# Patient Record
Sex: MALE | Race: WHITE | NOT HISPANIC OR LATINO | Employment: FULL TIME | URBAN - METROPOLITAN AREA
[De-identification: names, ages, dates, MRNs, and addresses within clinical notes are randomized per-mention and may not be internally consistent; named-entity substitution may affect disease eponyms.]

---

## 2019-11-04 ENCOUNTER — TELEPHONE (OUTPATIENT)
Dept: SCHEDULING | Facility: IMAGING CENTER | Age: 64
End: 2019-11-04

## 2020-02-21 ENCOUNTER — OFFICE VISIT (OUTPATIENT)
Dept: MEDICAL GROUP | Facility: MEDICAL CENTER | Age: 65
End: 2020-02-21
Payer: COMMERCIAL

## 2020-02-21 VITALS
HEIGHT: 72 IN | SYSTOLIC BLOOD PRESSURE: 118 MMHG | DIASTOLIC BLOOD PRESSURE: 72 MMHG | OXYGEN SATURATION: 97 % | WEIGHT: 184 LBS | HEART RATE: 68 BPM | BODY MASS INDEX: 24.92 KG/M2 | RESPIRATION RATE: 16 BRPM | TEMPERATURE: 97 F

## 2020-02-21 DIAGNOSIS — Z76.89 ENCOUNTER TO ESTABLISH CARE: ICD-10-CM

## 2020-02-21 DIAGNOSIS — E78.5 HYPERLIPIDEMIA, UNSPECIFIED HYPERLIPIDEMIA TYPE: ICD-10-CM

## 2020-02-21 DIAGNOSIS — Z12.12 SCREENING FOR COLORECTAL CANCER: ICD-10-CM

## 2020-02-21 DIAGNOSIS — E11.9 TYPE 2 DIABETES MELLITUS WITHOUT COMPLICATION, WITHOUT LONG-TERM CURRENT USE OF INSULIN (HCC): ICD-10-CM

## 2020-02-21 DIAGNOSIS — Z12.11 SCREENING FOR COLORECTAL CANCER: ICD-10-CM

## 2020-02-21 PROCEDURE — 99204 OFFICE O/P NEW MOD 45 MIN: CPT | Performed by: PHYSICIAN ASSISTANT

## 2020-02-21 RX ORDER — SIMVASTATIN 10 MG
TABLET ORAL
COMMUNITY
Start: 2020-01-25 | End: 2020-04-06 | Stop reason: SDUPTHER

## 2020-02-21 RX ORDER — FENOFIBRATE 160 MG/1
TABLET ORAL
COMMUNITY
End: 2020-04-06 | Stop reason: SDUPTHER

## 2020-02-21 RX ORDER — METFORMIN HYDROCHLORIDE 500 MG/1
1000 TABLET, EXTENDED RELEASE ORAL 2 TIMES DAILY
COMMUNITY
Start: 2020-01-26 | End: 2020-03-18 | Stop reason: SDUPTHER

## 2020-02-21 ASSESSMENT — PATIENT HEALTH QUESTIONNAIRE - PHQ9: CLINICAL INTERPRETATION OF PHQ2 SCORE: 0

## 2020-02-21 NOTE — LETTER
[facility]  Tony Morris P.A.-C.  [address]  [place] [address]  Fax: [phone]   Authorization for Release/Disclosure of   Protected Health Information   Name: LEONID MCGEE : 1955 SSN: [id number]   Address: [address] [place] [address] Phone:    [phone] (home) [phone] (work)   I authorize the entity listed below to release/disclose the PHI below to:   [facility]/Tony Morris P.A.-C. and Tony Morris P.A.-C.   Provider or Entity Name:  [facility]   Address   City, State, Zip   Phone:      Fax:     Reason for request: continuity of care   Information to be released: Labs done in past 6 months.   [  ] LAST COLONOSCOPY,  including any PATH REPORT and follow-up  [  ] LAST FIT/COLOGUARD RESULT [  ] LAST DEXA  [  ] LAST MAMMOGRAM  [  ] LAST PAP  [ X ] LAST LABS [  ] RETINA EXAM REPORT  [  ] IMMUNIZATION RECORDS  [  ] Release all info      [  ] Check here and initial the line next to each item to release ALL health information INCLUDING  _____ Care and treatment for drug and / or alcohol abuse  _____ HIV testing, infection status, or AIDS  _____ Genetic Testing    DATES OF SERVICE OR TIME PERIOD TO BE DISCLOSED: _____________  I understand and acknowledge that:  * This Authorization may be revoked at any time by you in writing, except if your health information has already been used or disclosed.  * Your health information that will be used or disclosed as a result of you signing this authorization could be re-disclosed by the recipient. If this occurs, your re-disclosed health information may no longer be protected by State or Federal laws.  * You may refuse to sign this Authorization. Your refusal will not affect your ability to obtain treatment.  * This Authorization becomes effective upon signing and will  on (date) __________.      If no date is indicated, this Authorization will  one (1) year from the signature date.    Name:  Hank Mariee    Signature:   Date:     2/21/2020       PLEASE FAX REQUESTED RECORDS BACK TO: [phone]

## 2020-02-21 NOTE — PROGRESS NOTES
Subjective:   Hank Mariee is a 64 y.o. male here today for establishing care, type 2 diabetes and hyperlipidemia.    Type 2 diabetes mellitus without complication, without long-term current use of insulin (HCC)  This is a 64-year-old male here today with his wife both of them to establish care.  Wife's name is Shirley.  History of diabetes and hyperlipidemia.  Takes metformin extended release 2000 mg daily.  Last labs were done in November.  [facility]  Also takes simvastatin and fenofibrate.  Had a colonoscopy 10 years ago.  Had some vaccinations that need to be updated in our vaccination records.  No complaints today.       Current medicines (including changes today)  Current Outpatient Medications   Medication Sig Dispense Refill   • metFORMIN ER (GLUCOPHAGE XR) 500 MG TABLET SR 24 HR Take 1,000 mg by mouth 2 Times a Day.     • simvastatin (ZOCOR) 10 MG Tab      • fenofibrate (TRIGLIDE) 160 MG tablet        No current facility-administered medications for this visit.      He  has no past medical history on file.    Social History and Family History were reviewed and updated.    ROS   No chest pain, no shortness of breath, no abdominal pain and all other systems were reviewed and are negative.       Objective:     /72 (BP Location: Right arm, Patient Position: Sitting, BP Cuff Size: Adult)   Pulse 68   Temp 36.1 °C (97 °F) (Temporal)   Resp 16   Ht 1.829 m (6')   Wt 83.5 kg (184 lb)   SpO2 97%  Body mass index is 24.95 kg/m².   Physical Exam:  Constitutional: Alert, no distress.  Skin: Warm, dry, good turgor, no rashes in visible areas.  Eye: Equal, round and reactive, conjunctiva clear, lids normal.  ENMT: Lips without lesions, good dentition, oropharynx clear.  Neck: Trachea midline, no masses.   Lymph: No cervical or supraclavicular lymphadenopathy  Respiratory: Unlabored respiratory effort, lungs clear to auscultation, no wheezes, no  mary.  Cardiovascular: Normal S1, S2, no murmur, no edema.  Psych: Alert and oriented x3, normal affect and mood.        Assessment and Plan:   The following treatment plan was discussed    1. Type 2 diabetes mellitus without complication, without long-term current use of insulin (HCC)  Chronic condition.  Likely stable.  Will obtain medical records from [facility].  Advised that I will change his metformin to thousand milligrams twice a day and will likely change to IR instead of ER.    2. Hyperlipidemia, unspecified hyperlipidemia type  Chronic condition.  Status unknown.  Will obtain recent labs from [facility].    3. Encounter to establish care    4. Screening for colorectal cancer  Furred to GI for colonoscopy.  - REFERRAL TO GI FOR COLONOSCOPY      Followup: Return in about 3 months (around 5/21/2020), or if symptoms worsen or fail to improve, for In May.    Please note that this dictation was created using voice recognition software. I have made every reasonable attempt to correct obvious errors, but I expect that there are errors of grammar and possibly content that I did not discover before finalizing the note.

## 2020-02-21 NOTE — ASSESSMENT & PLAN NOTE
This is a 64-year-old male here today with his wife both of them to establish care.  Wife's name is Shirley.  History of diabetes and hyperlipidemia.  Takes metformin extended release 2000 mg daily.  Last labs were done in November.  Things were in normal range and done at [facility].  Also takes simvastatin and fenofibrate.  Had a colonoscopy 10 years ago.  Had some vaccinations that need to be updated in our vaccination records.  No complaints today.

## 2020-03-18 NOTE — TELEPHONE ENCOUNTER
received via [employer]    Received request via: Pharmacy    Was the patient seen in the last year in this department? Yes     Next Appt: 05/12/2020      Does the patient have an active prescription (recently filled or refills available) for medication(s) requested? No     Requested Prescriptions     Pending Prescriptions Disp Refills   • metFORMIN ER (GLUCOPHAGE XR) 500 MG TABLET SR 24 HR       Sig: Take 2 Tabs by mouth 2 Times a Day.

## 2020-03-19 RX ORDER — METFORMIN HYDROCHLORIDE 500 MG/1
1000 TABLET, EXTENDED RELEASE ORAL 2 TIMES DAILY
Qty: 180 TAB | Refills: 0 | Status: SHIPPED | OUTPATIENT
Start: 2020-03-19 | End: 2020-05-20 | Stop reason: SDUPTHER

## 2020-05-01 ENCOUNTER — TELEPHONE (OUTPATIENT)
Dept: MEDICAL GROUP | Facility: MEDICAL CENTER | Age: 65
End: 2020-05-01

## 2020-05-01 NOTE — TELEPHONE ENCOUNTER
----- Message from Tony Morris P.A.-C. sent at 5/1/2020  8:16 AM PDT -----  Regarding: FW: Non-Urgent Medical Question  Contact: [phone]  Would [facility] be able [facility] for both he and his wife Shirley, same last name, about getting labs for the past 3-6 months?  Or does he need to sign a medical release?    Tony  ----- Message -----  From: Gilmar Perez, Med Ass't  Sent: 4/30/2020   4:21 PM PDT  To: Tony Morris P.A.-C.  Subject: FW: Non-Urgent Medical Question                    ----- Message -----  From: Hank Mariee  Sent: 4/30/2020   3:25 PM PDT  To: [facility]  Clinical Staff  Subject: Non-Urgent Medical Question                      Hi - we requested the most recent lab results from our previous physician, Dr. Jo-Ann Parr.  We have been told that we need to provide a form from your office requesting the lab results - and then they will release them to your office.  Shirley and I set to see you on May 12th and thought you would want the most recent results from December - we have separately [facility] the [facility] and they have now submitted up to September 2019.

## 2020-05-12 ENCOUNTER — OFFICE VISIT (OUTPATIENT)
Dept: MEDICAL GROUP | Facility: MEDICAL CENTER | Age: 65
End: 2020-05-12
Payer: COMMERCIAL

## 2020-05-12 VITALS
HEART RATE: 84 BPM | RESPIRATION RATE: 16 BRPM | BODY MASS INDEX: 24.78 KG/M2 | OXYGEN SATURATION: 96 % | TEMPERATURE: 96.8 F | DIASTOLIC BLOOD PRESSURE: 58 MMHG | WEIGHT: 182.98 LBS | HEIGHT: 72 IN | SYSTOLIC BLOOD PRESSURE: 106 MMHG

## 2020-05-12 DIAGNOSIS — E78.5 HYPERLIPIDEMIA, UNSPECIFIED HYPERLIPIDEMIA TYPE: ICD-10-CM

## 2020-05-12 DIAGNOSIS — E11.9 TYPE 2 DIABETES MELLITUS WITHOUT COMPLICATION, WITHOUT LONG-TERM CURRENT USE OF INSULIN (HCC): ICD-10-CM

## 2020-05-12 DIAGNOSIS — Z11.59 ENCOUNTER FOR HEPATITIS C SCREENING TEST FOR LOW RISK PATIENT: ICD-10-CM

## 2020-05-12 PROCEDURE — 99214 OFFICE O/P EST MOD 30 MIN: CPT | Performed by: PHYSICIAN ASSISTANT

## 2020-05-12 NOTE — PROGRESS NOTES
Subjective:   Hank Mariee is a 64 y.o. male here today for diabetes, hyperlipidemia and preventative health care.    Type 2 diabetes mellitus without complication, without long-term current use of insulin (HCC)  This is a 64-year-old male here today with his wife to discuss his health.  Chronic history of diabetes.  Last A1c at 6.5.  No refill needed medications needed today.  Takes metformin 500 mg of 2 tablets of extended release twice a day.  Does need an updated A1c.  Also microalbumin level.     Hyperlipidemia  Chronic condition.  Takes Zocor 10 mg daily.  Last cholesterol profile in good range.  No family history of heart disease.       Current medicines (including changes today)  Current Outpatient Medications   Medication Sig Dispense Refill   • simvastatin (ZOCOR) 10 MG Tab Take 1 Tab by mouth every evening. 90 Tab 1   • fenofibrate (TRIGLIDE) 160 MG tablet Take 1 Tab by mouth every day. 90 Tab 1   • metFORMIN ER (GLUCOPHAGE XR) 500 MG TABLET SR 24 HR Take 2 Tabs by mouth 2 Times a Day. 180 Tab 0     No current facility-administered medications for this visit.      He  has no past medical history on file.    Social History and Family History were reviewed and updated.    ROS   No chest pain, no shortness of breath, no abdominal pain and all other systems were reviewed and are negative.       Objective:     /58   Pulse 84   Temp 36 °C (96.8 °F) (Temporal)   Resp 16   Ht 1.829 m (6')   Wt 83 kg (182 lb 15.7 oz)   SpO2 96%  Body mass index is 24.82 kg/m².   Physical Exam:  Constitutional: Alert, no distress.  Skin: Warm, dry, good turgor, no rashes in visible areas.  Eye: Equal, round and reactive, conjunctiva clear, lids normal.  ENMT: Lips without lesions, good dentition, oropharynx clear.  Neck: Trachea midline, no masses.   Lymph: No cervical or supraclavicular lymphadenopathy  Respiratory: Unlabored respiratory effort, lungs appear clear, no wheezes.  Cardiovascular: Regular rate  and rhythm.  Psych: Alert and oriented x3, normal affect and mood.        Assessment and Plan:   The following treatment plan was discussed    1. Type 2 diabetes mellitus without complication, without long-term current use of insulin (HCC)  Chronic condition.  Stable.  Will renew medication when it comes up for renewal.  Continue medications as directed.  Ordered labs.  - HEMOGLOBIN A1C; Future  - MICROALBUMIN CREAT RATIO URINE (LAB COLLECT); Future    2. Hyperlipidemia, unspecified hyperlipidemia type  Chronic condition.  Stable.  Continue statin.    3. Encounter for hepatitis C screening test for low risk patient  Hep C viral antibody ordered.  Low risk.  - HEP C VIRUS ANTIBODY; Future    Await colonoscopy scheduled for sometime in the next few months.  Also will need to order PSA during next labs.  They go to [facility].    Followup: Return in about 6 months (around 11/12/2020), or if symptoms worsen or fail to improve.    Please note that this dictation was created using voice recognition software. I have made every reasonable attempt to correct obvious errors, but I expect that there are errors of grammar and possibly content that I did not discover before finalizing the note.

## 2020-05-12 NOTE — ASSESSMENT & PLAN NOTE
Chronic condition.  Takes Zocor 10 mg daily.  Last cholesterol profile in good range.  No family history of heart disease.

## 2020-05-12 NOTE — ASSESSMENT & PLAN NOTE
This is a 64-year-old male here today with his wife to discuss his health.  Chronic history of diabetes.  Last A1c at 6.5.  No refill needed medications needed today.  Takes metformin 500 mg of 2 tablets of extended release twice a day.  Does need an updated A1c.  Also microalbumin level.

## 2020-05-19 ENCOUNTER — PATIENT MESSAGE (OUTPATIENT)
Dept: MEDICAL GROUP | Facility: MEDICAL CENTER | Age: 65
End: 2020-05-19

## 2020-05-20 RX ORDER — METFORMIN HYDROCHLORIDE 500 MG/1
1000 TABLET, EXTENDED RELEASE ORAL 2 TIMES DAILY
Qty: 180 TAB | Refills: 0 | Status: SHIPPED | OUTPATIENT
Start: 2020-05-20 | End: 2020-07-18 | Stop reason: SDUPTHER

## 2020-06-06 LAB
ALBUMIN/CREAT UR: 3 MG/G CREAT (ref 0–29)
CREAT UR-MCNC: 145.3 MG/DL
HBA1C MFR BLD: 6.9 % (ref 4.8–5.6)
HCV AB S/CO SERPL IA: 0.2 S/CO RATIO (ref 0–0.9)
MICROALBUMIN UR-MCNC: 4.7 UG/ML

## 2020-07-18 DIAGNOSIS — E78.5 HYPERLIPIDEMIA, UNSPECIFIED HYPERLIPIDEMIA TYPE: ICD-10-CM

## 2020-07-18 DIAGNOSIS — E11.9 TYPE 2 DIABETES MELLITUS WITHOUT COMPLICATION, WITHOUT LONG-TERM CURRENT USE OF INSULIN (HCC): ICD-10-CM

## 2020-07-18 RX ORDER — FENOFIBRATE 160 MG/1
160 TABLET ORAL DAILY
Qty: 90 TAB | Refills: 1 | Status: SHIPPED | OUTPATIENT
Start: 2020-07-18 | End: 2020-11-17 | Stop reason: SDUPTHER

## 2020-07-18 RX ORDER — METFORMIN HYDROCHLORIDE 500 MG/1
1000 TABLET, EXTENDED RELEASE ORAL 2 TIMES DAILY
Qty: 180 TAB | Refills: 1 | Status: SHIPPED | OUTPATIENT
Start: 2020-07-18 | End: 2020-10-18

## 2020-07-18 RX ORDER — SIMVASTATIN 10 MG
10 TABLET ORAL EVERY EVENING
Qty: 90 TAB | Refills: 1 | Status: SHIPPED | OUTPATIENT
Start: 2020-07-18 | End: 2020-11-17 | Stop reason: SDUPTHER

## 2020-11-16 RX ORDER — SIMVASTATIN 10 MG
TABLET ORAL
COMMUNITY
End: 2020-11-17

## 2020-11-17 ENCOUNTER — OFFICE VISIT (OUTPATIENT)
Dept: MEDICAL GROUP | Facility: MEDICAL CENTER | Age: 65
End: 2020-11-17
Payer: COMMERCIAL

## 2020-11-17 VITALS
BODY MASS INDEX: 24.49 KG/M2 | SYSTOLIC BLOOD PRESSURE: 116 MMHG | DIASTOLIC BLOOD PRESSURE: 64 MMHG | HEART RATE: 70 BPM | OXYGEN SATURATION: 98 % | RESPIRATION RATE: 16 BRPM | HEIGHT: 72 IN | TEMPERATURE: 97.4 F | WEIGHT: 180.78 LBS

## 2020-11-17 DIAGNOSIS — E78.5 HYPERLIPIDEMIA, UNSPECIFIED HYPERLIPIDEMIA TYPE: ICD-10-CM

## 2020-11-17 DIAGNOSIS — Z12.5 SCREENING PSA (PROSTATE SPECIFIC ANTIGEN): ICD-10-CM

## 2020-11-17 DIAGNOSIS — E11.9 TYPE 2 DIABETES MELLITUS WITHOUT COMPLICATION, WITHOUT LONG-TERM CURRENT USE OF INSULIN (HCC): ICD-10-CM

## 2020-11-17 PROBLEM — L84 CORN OF TOE: Status: ACTIVE | Noted: 2020-11-17

## 2020-11-17 PROCEDURE — 99214 OFFICE O/P EST MOD 30 MIN: CPT | Performed by: PHYSICIAN ASSISTANT

## 2020-11-17 RX ORDER — FENOFIBRATE 160 MG/1
160 TABLET ORAL DAILY
Qty: 90 TAB | Refills: 1 | Status: SHIPPED | OUTPATIENT
Start: 2020-11-17

## 2020-11-17 RX ORDER — SIMVASTATIN 10 MG
10 TABLET ORAL EVERY EVENING
Qty: 90 TAB | Refills: 1 | Status: SHIPPED | OUTPATIENT
Start: 2020-11-17

## 2020-11-17 RX ORDER — METFORMIN HYDROCHLORIDE 500 MG/1
1000 TABLET, EXTENDED RELEASE ORAL 2 TIMES DAILY
Qty: 180 TAB | Refills: 1 | Status: SHIPPED | OUTPATIENT
Start: 2020-11-17

## 2020-11-17 NOTE — ASSESSMENT & PLAN NOTE
Chronic condition.  Last cholesterol profile in good ranges.  Takes Zocor 10 mg daily.  Has been taking medication for many years.  No family history of heart disease but there is a family history of stroke.

## 2020-11-17 NOTE — ASSESSMENT & PLAN NOTE
This is a 65-year-old male here today with his wife, Shirley.  History of type 2 diabetes.  Last A1c well controlled.  Currently on Metformin 500 mg XR tablets 2 tablets daily.  No new complaints today.  Doing well.  Does state when he was actively hiking his heart rate will go up to 150.  He was asymptomatic at the time.  Typical resting heart rate is in the 50s.  Today his heart rate is at 70.

## 2020-11-17 NOTE — PROGRESS NOTES
Subjective:   Hank Mariee is a 65 y.o. male here today for type 2 diabetes and hyperlipidemia.    Type 2 diabetes mellitus without complication, without long-term current use of insulin (HCC)  This is a 65-year-old male here today with his wife, Shirley.  History of type 2 diabetes.  Last A1c well controlled.  Currently on Metformin 500 mg XR tablets 2 tablets daily.  No new complaints today.  Doing well.  Does state when he was actively hiking his heart rate will go up to 150.  He was asymptomatic at the time.  Typical resting heart rate is in the 50s.  Today his heart rate is at 70.    Hyperlipidemia  Chronic condition.  Last cholesterol profile in good ranges.  Takes Zocor 10 mg daily.  Has been taking medication for many years.  No family history of heart disease but there is a family history of stroke.       Current medicines (including changes today)  Current Outpatient Medications   Medication Sig Dispense Refill   • simvastatin (ZOCOR) 10 MG Tab Take 1 Tab by mouth every evening. 90 Tab 1   • fenofibrate (TRIGLIDE) 160 MG tablet Take 1 Tab by mouth every day. 90 Tab 1   • metFORMIN ER (GLUCOPHAGE XR) 500 MG TABLET SR 24 HR Take 2 Tabs by mouth 2 times a day. 180 Tab 1     No current facility-administered medications for this visit.      He  has no past medical history on file.    Social History and Family History were reviewed and updated.    ROS   No chest pain, no shortness of breath, no abdominal pain and all other systems were reviewed and are negative.       Objective:     /64   Pulse 70   Temp 36.3 °C (97.4 °F) (Temporal)   Resp 16   Ht 1.829 m (6')   Wt 82 kg (180 lb 12.4 oz)   SpO2 98%  Body mass index is 24.52 kg/m².   Physical Exam:  Constitutional: Alert, no distress.  Skin: Warm, dry, good turgor, no rashes in visible areas.  Eye: Equal, round and reactive, conjunctiva clear, lids normal.  ENMT: Lips without lesions, good dentition, oropharynx clear.  Neck: Trachea midline,  no masses.   Lymph: No cervical or supraclavicular lymphadenopathy  Respiratory: Unlabored respiratory effort, lungs clear to auscultation, no wheezes, no ronchi.  Cardiovascular: Normal S1, S2, no murmur, no edema.  Psych: Alert and oriented x3, normal affect and mood.        Assessment and Plan:   The following treatment plan was discussed    1. Type 2 diabetes mellitus without complication, without long-term current use of insulin (HCC)  Chronic condition.  Likely stable.  Order labs for June.  Fast 8 hours.  Renewed Metformin.  - Comp Metabolic Panel; Future  - HEMOGLOBIN A1C; Future  - Lipid Profile; Future  - metFORMIN ER (GLUCOPHAGE XR) 500 MG TABLET SR 24 HR; Take 2 Tabs by mouth 2 times a day.  Dispense: 180 Tab; Refill: 1    2. Hyperlipidemia, unspecified hyperlipidemia type  Chronic condition.  Stable.  Renewed Zocor and fenofibrate.  No known history of CAD.  Ordered CT cardiac scoring and discussed risk and benefits.  - CT-CARDIAC SCORING; Future  - simvastatin (ZOCOR) 10 MG Tab; Take 1 Tab by mouth every evening.  Dispense: 90 Tab; Refill: 1  - fenofibrate (TRIGLIDE) 160 MG tablet; Take 1 Tab by mouth every day.  Dispense: 90 Tab; Refill: 1    3. Screening PSA (prostate specific antigen)  PSA ordered.  Screening.  Asymptomatic.  - PROSTATE SPECIFIC AG SCREENING; Future      Followup: Return in about 6 months (around 5/17/2021), or if symptoms worsen or fail to improve.    Please note that this dictation was created using voice recognition software. I have made every reasonable attempt to correct obvious errors, but I expect that there are errors of grammar and possibly content that I did not discover before finalizing the note.

## 2020-12-14 ENCOUNTER — HOSPITAL ENCOUNTER (OUTPATIENT)
Dept: LAB | Facility: MEDICAL CENTER | Age: 65
End: 2020-12-14
Attending: PHYSICIAN ASSISTANT
Payer: COMMERCIAL

## 2020-12-14 DIAGNOSIS — Z20.822 SUSPECTED COVID-19 VIRUS INFECTION: ICD-10-CM

## 2020-12-14 LAB — COVID ORDER STATUS COVID19: NORMAL

## 2020-12-14 PROCEDURE — U0003 INFECTIOUS AGENT DETECTION BY NUCLEIC ACID (DNA OR RNA); SEVERE ACUTE RESPIRATORY SYNDROME CORONAVIRUS 2 (SARS-COV-2) (CORONAVIRUS DISEASE [COVID-19]), AMPLIFIED PROBE TECHNIQUE, MAKING USE OF HIGH THROUGHPUT TECHNOLOGIES AS DESCRIBED BY CMS-2020-01-R: HCPCS

## 2020-12-14 PROCEDURE — C9803 HOPD COVID-19 SPEC COLLECT: HCPCS

## 2020-12-15 PROBLEM — U07.1 COVID-19 VIRUS INFECTION: Status: ACTIVE | Noted: 2020-12-15

## 2020-12-15 LAB
SARS-COV-2 RNA RESP QL NAA+PROBE: DETECTED
SPECIMEN SOURCE: ABNORMAL

## 2021-03-03 DIAGNOSIS — Z23 NEED FOR VACCINATION: ICD-10-CM

## 2021-07-01 ENCOUNTER — OFFICE VISIT (OUTPATIENT)
Dept: URGENT CARE | Facility: PHYSICIAN GROUP | Age: 66
End: 2021-07-01
Payer: COMMERCIAL

## 2021-07-01 VITALS
HEART RATE: 67 BPM | TEMPERATURE: 98.4 F | WEIGHT: 177 LBS | BODY MASS INDEX: 23.98 KG/M2 | SYSTOLIC BLOOD PRESSURE: 130 MMHG | DIASTOLIC BLOOD PRESSURE: 72 MMHG | OXYGEN SATURATION: 98 % | RESPIRATION RATE: 16 BRPM | HEIGHT: 72 IN

## 2021-07-01 DIAGNOSIS — H01.00B BLEPHARITIS OF BOTH UPPER AND LOWER EYELID OF LEFT EYE, UNSPECIFIED TYPE: ICD-10-CM

## 2021-07-01 PROBLEM — E11.8 DISORDER ASSOCIATED WITH TYPE 2 DIABETES MELLITUS (HCC): Status: ACTIVE | Noted: 2021-02-01

## 2021-07-01 PROCEDURE — 99213 OFFICE O/P EST LOW 20 MIN: CPT | Performed by: NURSE PRACTITIONER

## 2021-07-01 RX ORDER — POLYMYXIN B SULFATE AND TRIMETHOPRIM 1; 10000 MG/ML; [USP'U]/ML
1 SOLUTION OPHTHALMIC 4 TIMES DAILY
Qty: 10 ML | Refills: 0 | Status: SHIPPED | OUTPATIENT
Start: 2021-07-01 | End: 2021-07-11

## 2021-07-01 ASSESSMENT — ENCOUNTER SYMPTOMS
DOUBLE VISION: 0
COUGH: 0
BLURRED VISION: 0
EYE DISCHARGE: 1
FEVER: 0
VOMITING: 0
PHOTOPHOBIA: 0
FOREIGN BODY SENSATION: 0
EYE PAIN: 0
EYE ITCHING: 1

## 2021-07-02 NOTE — PROGRESS NOTES
Subjective:     Hank Mariee is a 65 y.o. male who presents for Conjunctivitis (x4 days, Pt states pos conjunctivitis, itchy, watery eyes, swollen, uncomfortable, pt states at night is worse  )      Left eye, redness and irritation to upper and lower lids x 4 days. No hx od allergies. Started as a watery, itchy eye. Benadryl helped some. Now right has a similar sensation. Scratchiness intermittent. No vision changes. No redness of eyeball. Mucus at night. Sticky eye lashes. No use of contacts. Wears glasses. Feels fine otherwise.    Conjunctivitis  This is a new problem. The current episode started in the past 7 days. Pertinent negatives include no coughing, fever or vomiting.   Eye Problem   The left eye is affected. This is a new problem. The current episode started in the past 7 days. The problem has been gradually worsening. The pain is at a severity of 0/10. The patient is experiencing no pain. There is no known exposure to pink eye. He does not wear contacts. Associated symptoms include an eye discharge and itching. Pertinent negatives include no blurred vision, double vision, fever, foreign body sensation, photophobia, recent URI or vomiting.       History reviewed. No pertinent past medical history.    History reviewed. No pertinent surgical history.    Social History     Socioeconomic History   • Marital status:      Spouse name: Not on file   • Number of children: Not on file   • Years of education: Not on file   • Highest education level: Not on file   Occupational History   • Not on file   Tobacco Use   • Smoking status: Never Smoker   • Smokeless tobacco: Never Used   Substance and Sexual Activity   • Alcohol use: Not Currently   • Drug use: Never   • Sexual activity: Not on file   Other Topics Concern   • Not on file   Social History Narrative   • Not on file     Social Determinants of Health     Financial Resource Strain:    • Difficulty of Paying Living Expenses:    Food  Insecurity:    • Worried About Running Out of Food in the Last Year:    • Ran Out of Food in the Last Year:    Transportation Needs:    • Lack of Transportation (Medical):    • Lack of Transportation (Non-Medical):    Physical Activity:    • Days of Exercise per Week:    • Minutes of Exercise per Session:    Stress:    • Feeling of Stress :    Social Connections:    • Frequency of Communication with Friends and Family:    • Frequency of Social Gatherings with Friends and Family:    • Attends [employer]ah[employer] Services:    • Active Member of Clubs or Organizations:    • Attends Club or Organization Meetings:    • Marital Status:    Intimate Partner Violence:    • Fear of Current or Ex-Partner:    • Emotionally Abused:    • Physically Abused:    • Sexually Abused:         History reviewed. No pertinent family history.     No Known Allergies    Review of Systems   Constitutional: Negative for fever.   Eyes: Positive for discharge and itching. Negative for blurred vision, double vision, photophobia and pain.   Respiratory: Negative for cough.    Gastrointestinal: Negative for vomiting.   Endo/Heme/Allergies: Negative for environmental allergies.   All other systems reviewed and are negative.       Objective:   /72   Pulse 67   Temp 36.9 °C (98.4 °F) (Temporal)   Resp 16   Ht 1.829 m (6')   Wt 80.3 kg (177 lb)   SpO2 98%   BMI 24.01 kg/m²     Physical Exam  Vitals reviewed.   Constitutional:       General: He is not in acute distress.     Appearance: He is well-developed.   HENT:      Head: Normocephalic and atraumatic.      Right Ear: External ear normal.      Left Ear: External ear normal.      Nose: Nose normal.   Eyes:      General:         Left eye: Discharge present.No hordeolum.      Conjunctiva/sclera: Conjunctivae normal.      Left eye: Left conjunctiva is not injected. No chemosis, exudate or hemorrhage.     Pupils: Pupils are equal, round, and reactive to light.      Comments: Left eye: Watery discharge.  Erythema of bilateral lids.    Cardiovascular:      Rate and Rhythm: Normal rate.   Pulmonary:      Effort: Pulmonary effort is normal.   Musculoskeletal:         General: Normal range of motion.      Cervical back: Normal range of motion.   Skin:     General: Skin is warm and dry.      Findings: No rash.   Neurological:      General: No focal deficit present.      Mental Status: He is alert and oriented to person, place, and time.      GCS: GCS eye subscore is 4. GCS verbal subscore is 5. GCS motor subscore is 6.   Psychiatric:         Mood and Affect: Mood normal.         Speech: Speech normal.         Behavior: Behavior normal.         Thought Content: Thought content normal.         Judgment: Judgment normal.         Assessment/Plan:   1. Blepharitis of both upper and lower eyelid of left eye, unspecified type  - polymixin-trimethoprim (POLYTRIM) 51198-1.1 UNIT/ML-% Solution; Administer 1 Drop into the left eye 4 times a day for 10 days.  Dispense: 10 mL; Refill: 0  -Eyelid and hand hygiene.  -Warm compress.    -Cleanse and massage eyelid. Use warm water or diluted baby shampoo on a clean wash cloth, gauze pad, or cotton swab; and gently massage the edge of the eyelid with a gentle circular motions. Over the counter eyelid cleaning solutions are also available.  -Artificial tears to moisturize eyes.    Follow up for persistent or worsening symptoms, increased redness or swelling, vision changes, decreased eye movement, new or increased pain, or fever.    Differential diagnosis, natural history, supportive care, and indications for immediate follow-up discussed.

## 2021-07-02 NOTE — PATIENT INSTRUCTIONS
-Eyelid and hand hygiene.  -Warm compress.    -Cleanse and massage eyelid. Use warm water or diluted baby shampoo on a clean wash cloth, gauze pad, or cotton swab; and gently massage the edge of the eyelid with a gentle circular motions. Over the counter eyelid cleaning solutions are also available.  -Artificial tears to moisturize eyes.    Follow up for persistent or worsening symptoms, increased redness or swelling, vision changes, decreased eye movement, new or increased pain, or fever.      Blepharitis  Blepharitis is inflammation of the eyelids. Blepharitis may happen with:  · Reddish, scaly skin around the scalp and eyebrows.  · Burning or itching of the eyelids.  · Eye discharge at night that causes the eyelashes to stick together in the morning.  · Eyelashes that fall out.  · Sensitivity to light.  Follow these instructions at home:  Pay attention to any changes in how your eyes look or feel. Tell your health care provider about any changes. Follow these instructions to help with your condition.  Keeping Clean    · Wash your hands often.  · Wash your eyelids with warm water or with warm water that is mixed with a small amount of baby shampoo. Do this two times per day or as often as needed.  · Wash your face and eyebrows at least once a day.  · Use a clean towel each time you dry your eyelids. Do not use this towel to clean or dry other areas of your body. Do not share your towel with anyone.  General instructions  · Avoid wearing makeup until you get better. Do not share makeup with anyone.  · Avoid rubbing your eyes.  · Apply warm compresses to your eyes 2 times per day for 10 minutes at a time, or as told by your health care provider.  · If you were prescribed an antibiotic ointment or steroid drops, apply or use the medicine as told by your health care provider. Do not stop using the medicine even if you feel better.  · Keep all follow-up visits as told by your health care provider. This is  important.  Contact a health care provider if:  · Your eyelids feel hot.  · You have blisters or a rash on your eyelids.  · The condition does not go away in 2-4 days.  · The inflammation gets worse.  Get help right away if:  · You have pain or redness that gets worse or spreads to other parts of your face.  · Your vision changes.  · You have pain when looking at lights or moving objects.  · You have a fever.  Summary  · Blepharitis is inflammation of the eyelids.  · Pay attention to any changes in how your eyes look or feel. Tell your health care provider about any changes.  · Follow home care instructions as told by your health care provider. Wash your hands often. Avoid wearing makeup. Do not rub your eyes.  · To treat this condition, use warm compresses and prescription ointments or eye drops.  · Let your health care provider know if you have vision changes, blisters or rash on eyelids, pain that spreads to your face, or warmth on your eyelids.  This information is not intended to replace advice given to you by your health care provider. Make sure you discuss any questions you have with your health care provider.  Document Released: 12/15/2001 Document Revised: 06/10/2019 Document Reviewed: 06/10/2019  Elsevier Patient Education © 2020 [employer]

## 2022-05-02 ENCOUNTER — OFFICE VISIT (OUTPATIENT)
Dept: URGENT CARE | Facility: PHYSICIAN GROUP | Age: 67
End: 2022-05-02
Payer: COMMERCIAL

## 2022-05-02 VITALS
WEIGHT: 184.2 LBS | HEART RATE: 62 BPM | TEMPERATURE: 97.8 F | SYSTOLIC BLOOD PRESSURE: 118 MMHG | HEIGHT: 71 IN | BODY MASS INDEX: 25.79 KG/M2 | RESPIRATION RATE: 14 BRPM | DIASTOLIC BLOOD PRESSURE: 56 MMHG | OXYGEN SATURATION: 99 %

## 2022-05-02 DIAGNOSIS — H10.503 BLEPHAROCONJUNCTIVITIS OF BOTH EYES, UNSPECIFIED BLEPHAROCONJUNCTIVITIS TYPE: ICD-10-CM

## 2022-05-02 PROCEDURE — 99213 OFFICE O/P EST LOW 20 MIN: CPT | Performed by: EMERGENCY MEDICINE

## 2022-05-02 RX ORDER — ERYTHROMYCIN 5 MG/G
1 OINTMENT OPHTHALMIC 3 TIMES DAILY
Qty: 1 G | Refills: 0 | Status: SHIPPED | OUTPATIENT
Start: 2022-05-02

## 2022-05-02 ASSESSMENT — ENCOUNTER SYMPTOMS
EYE PAIN: 1
EYE REDNESS: 1
BLURRED VISION: 0
EYE DISCHARGE: 1

## 2022-05-02 NOTE — PATIENT INSTRUCTIONS
Ketotifen eye solution  What is this medicine?  KETOTIFEN (cheri toe HUMBERTO fen) eye drops are used in the eye to prevent itching that is caused by allergies.  This medicine may be used for other purposes; ask your health care provider or pharmacist if you have questions.  COMMON BRAND NAME(S): Alaway, Children's Alaway, Claritin Eye, Eye Itch Relief, Itchy Eye, Zaditor, Zyrtec Itchy Eye  What should I tell my health care provider before I take this medicine?  They need to know if you have any of these conditions:  · wear contact lenses  · an unusual or allergic reaction to ketotifen, other medicines, foods, dyes, or preservatives  · pregnant or trying to get pregnant  · breast-feeding  How should I use this medicine?  This medicine is only for use in the eye. Do not take by mouth. Follow the directions on the prescription label. Wash hands before and after use. Tilt the head back slightly and pull down the lower eyelid with the index finger to form a pouch. Try not to touch the tip of the dropper to your eye, fingertips, or any other surface. Squeeze the prescribed number of drops into the pouch. Close the eye gently to spread the drops. Your vision may blur for a few minutes. Use your doses at regular intervals. Do not use your medicine more often than directed. If you use other eye medicines, they should be used at least 10 minutes before or after this medicine. Eye ointments should be applied last.  Talk to your pediatrician regarding the use of this medicine in children. Special care may be needed. While this drug may be prescribed for children as young as 3 years of age for selected conditions, precautions do apply.  Overdosage: If you think you have taken too much of this medicine contact a poison control center or emergency room at once.  NOTE: This medicine is only for you. Do not share this medicine with others.  What if I miss a dose?  If you miss a dose, use it as soon as you can. If it is almost time for  your next dose, use only that dose. Do not use double or extra doses.  What may interact with this medicine?  Interactions are not expected. Do not use any other eye products without telling your doctor or health care professional.  This list may not describe all possible interactions. Give your health care provider a list of all the medicines, herbs, non-prescription drugs, or dietary supplements you use. Also tell them if you smoke, drink alcohol, or use illegal drugs. Some items may interact with your medicine.  What should I watch for while using this medicine?  Tell your doctor or health care professional if your symptoms do not start to improve in 2 or 3 days.  Report any serious side effects promptly. Stop using this medicine if your eyes get swollen, painful, or have a discharge, and see your doctor or health care professional as soon as you can.  Contact lenses may be inserted 10 minutes after putting the medicine in the eye. Do not wear contact lenses if your eyes are red. You should not use this medicine to treat irritation that is caused by contact lenses.  What side effects may I notice from receiving this medicine?  Side effects that you should report to your doctor or health care professional as soon as possible:  · skin rash, hives, or itching  · swelling of the lips, tongue or face  Side effects that usually do not require medical attention (report to your doctor or health care professional if they continue or are bothersome):  · burning, discomfort, stinging, or tearing immediately after use  · eyelid swelling or eye dryness  · headache  · sensitivity of the eyes to sunlight  This list may not describe all possible side effects. Call your doctor for medical advice about side effects. You may report side effects to FDA at 6-612-FDA-7513.  Where should I keep my medicine?  Keep out of the reach of children.  Store between 4 and 25 degrees C (39 and 77 degrees F). Do not freeze. Protect from light.  Throw away any unused medicine after the expiration date.  NOTE: This sheet is a summary. It may not cover all possible information. If you have questions about this medicine, talk to your doctor, pharmacist, or health care provider.  © 2020 [employer] (2009-06-19 14:36:34)

## 2022-05-02 NOTE — PROGRESS NOTES
"Subjective     Renéemory Mariee is a 66 y.o. male who presents with Eye Pain (Both eyes feel \"scratchy\", but the R eye is worse than the left eye x3days. Was previously seen for this issue and condition improved w/ eye drops)            Eye Injury   Both eyes are affected.This is a recurrent problem. Episode onset: 3 days. The problem has been gradually worsening. There was no injury mechanism. Associated symptoms include an eye discharge and eye redness. Pertinent negatives include no blurred vision.   Notes onset of eye grittiness sensation; no foreign body.  Subsequently noted right greater than left eye discharge today.    Review of Systems   Eyes: Positive for pain, discharge and redness. Negative for blurred vision.   Endo/Heme/Allergies: Negative for environmental allergies.              Objective     /56 (BP Location: Left arm, Patient Position: Sitting)   Pulse 62   Temp 36.6 °C (97.8 °F)   Resp 14   Ht 1.803 m (5' 11\")   Wt 83.6 kg (184 lb 3.2 oz)   SpO2 99%   BMI 25.69 kg/m²      Physical Exam  Constitutional:       General: He is not in acute distress.     Appearance: Normal appearance.   HENT:      Head: Atraumatic.   Eyes:      General:         Right eye: No foreign body, discharge or hordeolum.         Left eye: No foreign body, discharge or hordeolum.      Extraocular Movements: Extraocular movements intact.      Conjunctiva/sclera:      Right eye: Right conjunctiva is injected. No chemosis, exudate or hemorrhage.     Left eye: Left conjunctiva is not injected. No exudate or hemorrhage.     Comments: Bilateral, right greater than left, upper greater than lower marginal lid edema, erythema.   Lymphadenopathy:      Head:      Right side of head: No preauricular adenopathy.      Left side of head: No preauricular adenopathy.   Skin:     General: Skin is warm and dry.      Findings: No rash.   Neurological:      Mental Status: He is alert.      Cranial Nerves: No facial asymmetry. "   Psychiatric:         Behavior: Behavior is cooperative.                             Assessment & Plan        1. Blepharoconjunctivitis of both eyes, unspecified blepharoconjunctivitis type  OTC ketotifen as needed.  Due to worsening:  - erythromycin 5 MG/GM Ointment; Apply 1 cm to both eyes 3 times a day.  Dispense: 1 g; Refill: 0

## 2025-04-08 ENCOUNTER — HOSPITAL ENCOUNTER (OUTPATIENT)
Dept: LAB | Facility: MEDICAL CENTER | Age: 70
End: 2025-04-08
Attending: NURSE PRACTITIONER
Payer: COMMERCIAL

## 2025-04-08 LAB
ALBUMIN SERPL BCP-MCNC: 4.3 G/DL (ref 3.2–4.9)
ALBUMIN/GLOB SERPL: 1.8 G/DL
ALP SERPL-CCNC: 47 U/L (ref 30–99)
ALT SERPL-CCNC: 14 U/L (ref 2–50)
ANION GAP SERPL CALC-SCNC: 11 MMOL/L (ref 7–16)
AST SERPL-CCNC: 19 U/L (ref 12–45)
BILIRUB SERPL-MCNC: 0.8 MG/DL (ref 0.1–1.5)
BUN SERPL-MCNC: 16 MG/DL (ref 8–22)
CALCIUM ALBUM COR SERPL-MCNC: 9 MG/DL (ref 8.5–10.5)
CALCIUM SERPL-MCNC: 9.2 MG/DL (ref 8.5–10.5)
CHLORIDE SERPL-SCNC: 103 MMOL/L (ref 96–112)
CHOLEST SERPL-MCNC: 112 MG/DL (ref 100–199)
CO2 SERPL-SCNC: 23 MMOL/L (ref 20–33)
CREAT SERPL-MCNC: 1.03 MG/DL (ref 0.5–1.4)
EST. AVERAGE GLUCOSE BLD GHB EST-MCNC: 171 MG/DL
GFR SERPLBLD CREATININE-BSD FMLA CKD-EPI: 78 ML/MIN/1.73 M 2
GLOBULIN SER CALC-MCNC: 2.4 G/DL (ref 1.9–3.5)
GLUCOSE SERPL-MCNC: 148 MG/DL (ref 65–99)
HBA1C MFR BLD: 7.6 % (ref 4–5.6)
HDLC SERPL-MCNC: 31 MG/DL
LDLC SERPL CALC-MCNC: 62 MG/DL
POTASSIUM SERPL-SCNC: 4.5 MMOL/L (ref 3.6–5.5)
PROT SERPL-MCNC: 6.7 G/DL (ref 6–8.2)
SODIUM SERPL-SCNC: 137 MMOL/L (ref 135–145)
TRIGL SERPL-MCNC: 97 MG/DL (ref 0–149)

## 2025-04-08 PROCEDURE — 83036 HEMOGLOBIN GLYCOSYLATED A1C: CPT

## 2025-04-08 PROCEDURE — 80053 COMPREHEN METABOLIC PANEL: CPT

## 2025-04-08 PROCEDURE — 36415 COLL VENOUS BLD VENIPUNCTURE: CPT

## 2025-04-08 PROCEDURE — 82570 ASSAY OF URINE CREATININE: CPT

## 2025-04-08 PROCEDURE — 80061 LIPID PANEL: CPT

## 2025-04-08 PROCEDURE — 82043 UR ALBUMIN QUANTITATIVE: CPT

## 2025-04-09 LAB
COLLECT DURATION TIME SPEC: NORMAL HR
CREAT 24H UR-MCNC: 86 MG/DL
MICROALBUMIN 24H UR-MCNC: <0.3 MG/DL
MICROALBUMIN/CREAT 24H UR: <3 MG/G (ref 0–30)
SPECIMEN VOL ?TM UR: NORMAL ML